# Patient Record
Sex: MALE | Race: BLACK OR AFRICAN AMERICAN | URBAN - METROPOLITAN AREA
[De-identification: names, ages, dates, MRNs, and addresses within clinical notes are randomized per-mention and may not be internally consistent; named-entity substitution may affect disease eponyms.]

---

## 2019-02-20 ENCOUNTER — IP HISTORICAL/CONVERTED ENCOUNTER (OUTPATIENT)
Dept: OTHER | Age: 54
End: 2019-02-20

## 2019-09-04 ENCOUNTER — HOSPITAL ENCOUNTER (OUTPATIENT)
Dept: LAB | Age: 54
Discharge: HOME | End: 2019-09-04

## 2023-05-18 RX ORDER — TRIAMCINOLONE ACETONIDE 1 MG/G
CREAM TOPICAL
COMMUNITY
Start: 2016-08-11

## 2023-05-18 RX ORDER — KETOCONAZOLE 20 MG/ML
SHAMPOO TOPICAL
COMMUNITY
Start: 2016-08-04

## 2023-05-18 RX ORDER — CLOTRIMAZOLE AND BETAMETHASONE DIPROPIONATE 10; .5 MG/ML; MG/ML
LOTION TOPICAL
COMMUNITY
Start: 2016-08-11

## 2023-05-18 RX ORDER — CLOTRIMAZOLE AND BETAMETHASONE DIPROPIONATE 10; .64 MG/G; MG/G
CREAM TOPICAL
COMMUNITY
Start: 2016-08-11

## 2023-09-13 ENCOUNTER — HOSPITAL ENCOUNTER (EMERGENCY)
Facility: HOSPITAL | Age: 58
Discharge: HOME | End: 2023-09-13
Payer: COMMERCIAL

## 2023-09-13 VITALS
HEART RATE: 71 BPM | SYSTOLIC BLOOD PRESSURE: 140 MMHG | OXYGEN SATURATION: 99 % | DIASTOLIC BLOOD PRESSURE: 90 MMHG | BODY MASS INDEX: 33.33 KG/M2 | TEMPERATURE: 98.5 F | WEIGHT: 225 LBS | RESPIRATION RATE: 16 BRPM | HEIGHT: 69 IN

## 2023-09-13 DIAGNOSIS — L40.9 PSORIASIS: Primary | ICD-10-CM

## 2023-09-13 PROCEDURE — 99283 EMERGENCY DEPT VISIT LOW MDM: CPT

## 2023-09-13 RX ORDER — LOSARTAN POTASSIUM 25 MG/1
TABLET ORAL
COMMUNITY
Start: 2023-08-19

## 2023-09-13 RX ORDER — CLOBETASOL PROPIONATE 0.05 MG/G
GEL TOPICAL
COMMUNITY
Start: 2023-09-07

## 2023-09-13 RX ORDER — ERGOCALCIFEROL 1.25 MG/1
CAPSULE ORAL
COMMUNITY
Start: 2023-08-14

## 2023-09-13 RX ORDER — KETOCONAZOLE 20 MG/ML
SHAMPOO TOPICAL
Qty: 120 ML | Refills: 0 | Status: SHIPPED | OUTPATIENT
Start: 2023-09-13

## 2023-09-13 RX ORDER — LOSARTAN POTASSIUM 25 MG/1
25 TABLET ORAL DAILY
COMMUNITY
Start: 2021-12-21

## 2023-09-13 RX ORDER — TRIAMCINOLONE ACETONIDE 5 MG/G
CREAM TOPICAL
Qty: 454 G | Refills: 0 | Status: SHIPPED | OUTPATIENT
Start: 2023-09-13

## 2023-09-13 RX ORDER — BETAMETHASONE DIPROPIONATE 0.05 %
OINTMENT (GRAM) TOPICAL
COMMUNITY
Start: 2023-09-13

## 2023-09-13 RX ORDER — ATORVASTATIN CALCIUM 20 MG/1
TABLET, FILM COATED ORAL
COMMUNITY
Start: 2023-08-19

## 2023-09-13 ASSESSMENT — PAIN - FUNCTIONAL ASSESSMENT: PAIN_FUNCTIONAL_ASSESSMENT: 0-10

## 2023-09-13 ASSESSMENT — LIFESTYLE VARIABLES
HOW OFTEN DO YOU HAVE A DRINK CONTAINING ALCOHOL: NEVER
HOW MANY STANDARD DRINKS CONTAINING ALCOHOL DO YOU HAVE ON A TYPICAL DAY: PATIENT DOES NOT DRINK

## 2023-09-13 ASSESSMENT — PAIN SCALES - GENERAL: PAINLEVEL_OUTOF10: 10

## 2023-09-13 NOTE — ED PROVIDER NOTES
was added. Make sure you understand how and when to take each. * triamcinolone 0.1 % cream  Commonly known as: KENALOG  What changed: Another medication with the same name was added. Make sure you understand how and when to take each. * triamcinolone 0.5 % cream  Commonly known as: ARISTOCORT  Apply topically 3 times daily. What changed: You were already taking a medication with the same name, and this prescription was added. Make sure you understand how and when to take each. * This list has 4 medication(s) that are the same as other medications prescribed for you. Read the directions carefully, and ask your doctor or other care provider to review them with you. ASK your doctor about these medications      adalimumab 40 MG/0.8ML injection  Commonly known as: HUMIRA     atorvastatin 20 MG tablet  Commonly known as: LIPITOR     betamethasone dipropionate 0.05 % ointment     clobetasol propionate 0.05 % Gel gel     * clotrimazole-betamethasone 1-0.05 % lotion  Commonly known as: LOTRISONE     * clotrimazole-betamethasone 1-0.05 % cream  Commonly known as: LOTRISONE     * losartan 25 MG tablet  Commonly known as: COZAAR     * losartan 25 MG tablet  Commonly known as: COZAAR     * metFORMIN 500 MG tablet  Commonly known as: GLUCOPHAGE     * metFORMIN 500 MG tablet  Commonly known as: GLUCOPHAGE     vitamin D 1.25 MG (05943 UT) Caps capsule  Commonly known as: ERGOCALCIFEROL           * This list has 6 medication(s) that are the same as other medications prescribed for you. Read the directions carefully, and ask your doctor or other care provider to review them with you.                    Where to Get Your Medications        These medications were sent to [address] [place] [address], [address] [place] [address] [phone] Abdias Salgado [address] [place] [address], [facility] Casi Siddiqui      Phone: [phone]   ketoconazole 2 % shampoo  triamcinolone 0.5 % cream

## 2023-09-13 NOTE — ED TRIAGE NOTES
Pt arrived with complaint of rash, thinks its coming from soap at work, was prescribed medications for his psoriasis, states his rashes are not getting better.     Has matty on the October 3 with dermatologist